# Patient Record
Sex: MALE | Race: WHITE
[De-identification: names, ages, dates, MRNs, and addresses within clinical notes are randomized per-mention and may not be internally consistent; named-entity substitution may affect disease eponyms.]

---

## 2017-07-24 ENCOUNTER — HOSPITAL ENCOUNTER (OUTPATIENT)
Dept: HOSPITAL 80 - FIMAGING | Age: 82
End: 2017-07-24
Attending: FAMILY MEDICINE
Payer: COMMERCIAL

## 2017-07-24 DIAGNOSIS — M51.26: Primary | ICD-10-CM

## 2017-08-09 ENCOUNTER — HOSPITAL ENCOUNTER (OUTPATIENT)
Dept: HOSPITAL 80 - FIMAGING | Age: 82
End: 2017-08-09
Attending: INTERNAL MEDICINE
Payer: COMMERCIAL

## 2017-08-09 DIAGNOSIS — I71.2: ICD-10-CM

## 2017-08-09 DIAGNOSIS — R91.1: ICD-10-CM

## 2017-08-09 DIAGNOSIS — I25.10: Primary | ICD-10-CM

## 2017-08-09 DIAGNOSIS — J84.10: ICD-10-CM

## 2017-08-09 DIAGNOSIS — I71.4: ICD-10-CM

## 2017-08-09 PROCEDURE — 71275 CT ANGIOGRAPHY CHEST: CPT

## 2018-01-02 ENCOUNTER — HOSPITAL ENCOUNTER (OUTPATIENT)
Dept: HOSPITAL 80 - FIMAGING | Age: 83
End: 2018-01-02
Attending: PHYSICIAN ASSISTANT
Payer: COMMERCIAL

## 2018-01-02 DIAGNOSIS — M25.862: Primary | ICD-10-CM

## 2018-01-25 ENCOUNTER — HOSPITAL ENCOUNTER (OUTPATIENT)
Dept: HOSPITAL 80 - FIMAGING | Age: 83
End: 2018-01-25
Attending: ORTHOPAEDIC SURGERY
Payer: COMMERCIAL

## 2018-01-25 DIAGNOSIS — M23.242: Primary | ICD-10-CM

## 2018-01-25 DIAGNOSIS — M22.42: ICD-10-CM

## 2018-01-25 DIAGNOSIS — M71.22: ICD-10-CM

## 2018-09-25 ENCOUNTER — HOSPITAL ENCOUNTER (OUTPATIENT)
Dept: HOSPITAL 80 - FIMAGING | Age: 83
End: 2018-09-25
Attending: FAMILY MEDICINE
Payer: COMMERCIAL

## 2018-09-25 DIAGNOSIS — I71.2: ICD-10-CM

## 2018-09-25 DIAGNOSIS — R91.1: Primary | ICD-10-CM

## 2019-02-22 ENCOUNTER — HOSPITAL ENCOUNTER (EMERGENCY)
Dept: HOSPITAL 80 - FED | Age: 84
Discharge: HOME | End: 2019-02-22
Payer: COMMERCIAL

## 2019-02-22 VITALS — DIASTOLIC BLOOD PRESSURE: 60 MMHG | SYSTOLIC BLOOD PRESSURE: 104 MMHG

## 2019-02-22 DIAGNOSIS — E03.9: ICD-10-CM

## 2019-02-22 DIAGNOSIS — E78.5: ICD-10-CM

## 2019-02-22 DIAGNOSIS — E86.9: ICD-10-CM

## 2019-02-22 DIAGNOSIS — B34.9: Primary | ICD-10-CM

## 2019-02-22 LAB
INR PPP: 1.19 (ref 0.83–1.16)
PLATELET # BLD: 141 10^3/UL (ref 150–400)
PROTHROMBIN TIME: 15.3 SEC (ref 12–15)

## 2019-02-22 PROCEDURE — 93005 ELECTROCARDIOGRAM TRACING: CPT

## 2019-02-22 PROCEDURE — 71275 CT ANGIOGRAPHY CHEST: CPT

## 2019-02-22 PROCEDURE — 99285 EMERGENCY DEPT VISIT HI MDM: CPT

## 2019-02-22 PROCEDURE — 96360 HYDRATION IV INFUSION INIT: CPT

## 2019-02-22 PROCEDURE — 71046 X-RAY EXAM CHEST 2 VIEWS: CPT

## 2019-02-22 RX ADMIN — ONDANSETRON ONE: 2 SOLUTION INTRAMUSCULAR; INTRAVENOUS at 15:07

## 2019-02-22 RX ADMIN — ONDANSETRON ONE MG: 2 SOLUTION INTRAMUSCULAR; INTRAVENOUS at 15:04

## 2019-02-22 NOTE — EDPHY
H & P


Stated Complaint: Fatigue/SOB/cough


Time Seen by Provider: 02/22/19 14:36


HPI/ROS: 





CHIEF COMPLAINT:  Generalized Weakness





HISTORY OF PRESENT ILLNESS:  Patient is an 83-year-old man who states that 

yesterday he was feeling well and did 15 pull-ups.  Then he began to have a 

mild cough last night.  Then this morning he woke up very tired and fatigued 

with a dry cough.  He states that when he stands up he feels lightheaded and 

woozy.  His wife states that she feels the same but not as bad as her .  

He has not had a fever.  He did not get a flu vaccine this year.  No abdominal 

pain.  No chest pain.  No shortness of breath.  Mild nausea but no vomiting.  

No diarrhea.  His blood pressure is also low compared to baseline.  He states 

that normal systolic is around 115. No focal weakness or deficits.  No sore 

throat.  No runny nose.


Severity:  Moderate


Modifying factors:  Worsened by standing





REVIEW OF SYSTEMS:


Constitutional:  See HPI


EENTM: denies: blurred vision, double vision, nose congestion


Respiratory:  See HPI denies: shortness of breath


Cardiac: denies: chest pain, irregular heart rate, lightheadedness, palpitations


Gastrointestinal/Abdominal: denies: abdominal pain, diarrhea, nausea, vomiting, 

blood streaked stools


Genitourinary: denies: dysuria, frequency, hematuria, pain


Musculoskeletal: denies: joint pain, muscle pain


Skin: denies: lesions, rash, jaundice, bruising


Neurological: denies: headache, numbness, paresthesia, tingling, dizziness, 

weakness


Hematologic/Lymphatic: denies: blood clots, easy bleeding, easy bruising


Immunologic/allergic: denies: HIV/AIDS, transplant


 10 systems reviewed and negative except as noted





EXAM:


GENERAL:  Weak, moderate distress


HEAD:  Atraumatic, normocephalic.


EYES:  Pupils equal round and reactive to light, extraocular movements intact, 

sclera anicteric, conjunctiva are normal.


ENT:  TMs normal, nares patent, oropharynx clear without exudates.  dry mucous 

membranes.


NECK:  Normal range of motion, supple without lymphadenopathy or JVD.


LUNGS:  Breath sounds clear to auscultation bilaterally and equal.  No wheezes 

rales or rhonchi.


HEART:  Regular rate and rhythm without murmurs, rubs or gallops.


ABDOMEN:  Soft, nontender, normoactive bowel sounds.  No guarding, no rebound.  

No masses appreciated. 


BACK:  No CVA tenderness, no spinal tenderness, step-offs or deformities


EXTREMITIES:  Normal range of motion, no pitting or edema.  No clubbing or 

cyanosis.


NEUROLOGICAL:  Cranial nerves II through XII grossly intact.  Normal speech, 

normal gait.  5/5 strength, normal movement in all extremities, normal sensation

, normal reflexes


PSYCH:  Normal mood, normal affect.


SKIN:  Warm, dry, normal turgor, no visible rashes or lesions.








Source: Patient


Exam Limitations: No limitations





- Personal History


Current Tetanus/Diphtheria Vaccine: Unsure





- Medical/Surgical History


Hx Asthma: No


Hx Chronic Respiratory Disease: No


Hx Diabetes: No


Hx Cardiac Disease: No


Hx Renal Disease: No


Hx Cirrhosis: No


Hx Alcoholism: No


Other PMH: PE, HLD, hypothryoidism, pancreatitis





- Social History


Smoking Status: Never smoked


Alcohol Use: None


Constitutional: 


 Initial Vital Signs











Temperature (C)  36.7 C   02/22/19 14:19


 


Heart Rate  88   02/22/19 14:19


 


Respiratory Rate  22 H  02/22/19 14:19


 


Blood Pressure  90/54 L  02/22/19 14:19


 


O2 Sat (%)  95   02/22/19 14:19








 











O2 Delivery Mode               Room Air














Allergies/Adverse Reactions: 


 





Penicillins Allergy (Verified 02/22/19 14:24)


 








Home Medications: 














 Medication  Instructions  Recorded


 


Ascorbic Acid [Vitamin C 500 mg 500 mg PO DAILY 09/11/12





(*)]  


 


Aspirin [Baby Aspirin] 81 mg PO DAILY 09/11/12


 


Beta-Carotene [Beta Carotene] 25,000 unit PO Q2D 09/11/12


 


CALCIUM CARBONATE/VITAMIN D3 1 each PO DAILY 09/11/12





[CALCIUM + D 600 MG TABLET]  


 


Cholecalciferol Vit D3 [Vitamin D3 1,000 units PO Q3D 09/11/12





(*)]  


 


Cyanocobalamin [Vitamin B12 (*)] 1,000 mcg PO DAILY 09/11/12


 


Multivitamins [Multivitamin (*)] 1 each PO DAILY 09/11/12


 


Pharmacy Student Completed 9/11/12 09/11/12


 


Selenium [Selenium 200mcg (*)] 200 mcg PO DAILY 09/11/12


 


Simvastatin [Zocor 10 mg] 15 mg PO DAILY18 09/11/12


 


Ubidecarenone [Co Q-10 200 mg] 200 mg PO DAILY 09/11/12














Medical Decision Making





- Diagnostics


EKG Interpretation: 





An EKG obtained and was read and documented in trace view.  Please see trace 

view for full reading and report.  Sinus rhythm, no acute ischemic changes, 

slightly prolonged WA interval similar to previous 


Imaging Results: 


 Imaging Impressions





Chest X-Ray  02/22/19 14:43


Impression: Negative for acute cardiopulmonary abnormality. Mild enlargement 

cardiac silhouette.








Chest/Thorax CTA  02/22/19 16:18


Impression:


1. Slightly limited study with no visible pulmonary embolus.


2. New linear left lower lobe consolidation, more likely representing 

atelectasis than pneumonia, with an indistinct nodule in the superior segment 

left lower lobe, also possibly related to atelectasis. Short-term follow-up CT 

is recommended in 3 months.


3. 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


4. Coronary artery atherosclerosis.


5. Indeterminate pancreatic cyst, likely slightly increased since the comparison

, better visualized on the current study due to bolus timing. MR abdomen could 

be performed for further evaluation.


6. Additional findings as above.


 


Findings discussed with Dr. Jarrett Price on February 22, 2019 at 1709 hours.


 


 











Imaging: Discussed imaging studies w/ On call Radiologist


ED Course/Re-evaluation: 





4:20 p.m. the patient tells me he is feeling much better.  His blood pressures 

responded well to fluids.  He is currently 120/70.  I suggested that he would 

require admission because of his low blood pressure and generalized weakness.  

Respiratory PCR still pending.  Have added CT angio because the positive D-

dimer and history of PE.  His previous PE was provoked by long road trip.  He 

and his wife tell me now that his blood pressure does occasionally get down to 

90 even when he is asymptomatic.  He does not wish to be admitted to the 

hospital.  Will add urinalysis well.





5:20 p.m. patient's workup is unremarkable.  I suspect that he has some type of 

viral infection complicated by dehydration.  He tells me that he is feeling 

completely better.  His blood pressure is very stable after IV fluids.  He is 

eager to go home.  I offered admission because of his symptoms previously.  He 

and his wife decline.  We will do orthostatics.  Encouraged him to return to 

the emergency department if he feels worse or for symptoms return.


Differential Diagnosis: 





Partial list of the Differential diagnosis considered include but were not 

limited to;  dehydration, upper respiratory tract infection, influenza and 

although unlikely based on the history and physical exam, I also considered 

sepsis, urinary tract infection, pneumonia, PE, acute coronary disease. 





- Data Points


Laboratory Results: 


 Laboratory Results





 02/22/19 14:58 





 02/22/19 14:58 





 











  02/22/19 02/22/19 02/22/19





  16:29 14:59 14:58


 


WBC      





    


 


RBC      





    


 


Hgb      





    


 


Hct      





    


 


MCV      





    


 


MCH      





    


 


MCHC      





    


 


RDW      





    


 


Plt Count      





    


 


MPV      





    


 


Neut % (Auto)      





    


 


Lymph % (Auto)      





    


 


Mono % (Auto)      





    


 


Eos % (Auto)      





    


 


Baso % (Auto)      





    


 


Nucleat RBC Rel Count      





    


 


Absolute Neuts (auto)      





    


 


Absolute Lymphs (auto)      





    


 


Absolute Monos (auto)      





    


 


Absolute Eos (auto)      





    


 


Absolute Basos (auto)      





    


 


Absolute Nucleated RBC      





    


 


Immature Gran %      





    


 


Immature Gran #      





    


 


RBC/WBC/PLT Morphology      





    


 


Platelet Estimate      





    


 


PT      





    


 


INR      





    


 


APTT      





    


 


D-Dimer      





    


 


VBG Lactic Acid      1.0 mmol/L mmol/L





     (0.7-2.1) 


 


Sodium      





    


 


Potassium      





    


 


Chloride      





    


 


Carbon Dioxide      





    


 


Anion Gap      





    


 


BUN      





    


 


Creatinine      





    


 


Estimated GFR      





    


 


Glucose      





    


 


Calcium      





    


 


Total Bilirubin      





    


 


Conjugated Bilirubin      





    


 


Unconjugated Bilirubin      





    


 


AST      





    


 


ALT      





    


 


Alkaline Phosphatase      





    


 


POC Troponin I    0.01 ng/mL ng/mL  





    (0.00-0.08)  


 


Total Protein      





    


 


Albumin      





    


 


Urine Color  YELLOW     





    


 


Urine Appearance  CLEAR     





    


 


Urine pH  7.0     





   (5.0-7.5)   


 


Ur Specific Gravity  1.018     





   (1.002-1.030)   


 


Urine Protein  NEGATIVE     





   (NEGATIVE)   


 


Urine Ketones  TRACE  H     





   (NEGATIVE)   


 


Urine Blood  NEGATIVE     





   (NEGATIVE)   


 


Urine Nitrate  NEGATIVE     





   (NEGATIVE)   


 


Urine Bilirubin  NEGATIVE     





   (NEGATIVE)   


 


Urine Urobilinogen  NEGATIVE EU EU    





   (0.2-1.0)   


 


Ur Leukocyte Esterase  NEGATIVE     





   (NEGATIVE)   


 


Urine RBC  1-3 /hpf /hpf    





   (0-3)   


 


Urine WBC  1-3 /hpf /hpf    





   (0-3)   


 


Ur Epithelial Cells  NONE SEEN /lpf /lpf    





   (NONE-1+)   


 


Hyaline Casts  1-5 /lpf /lpf    





   (0-1)   


 


Urine Mucus  TRACE /lpf /lpf    





   (NONE-1+)   


 


Urine Glucose  NEGATIVE     





   (NEGATIVE)   














  02/22/19 02/22/19 02/22/19





  14:58 14:58 14:58


 


WBC      6.25 10^3/uL 10^3/uL





     (3.80-9.50) 


 


RBC      4.90 10^6/uL 10^6/uL





     (4.40-6.38) 


 


Hgb      15.2 g/dL g/dL





     (13.7-17.5) 


 


Hct      45.3 % %





     (40.0-51.0) 


 


MCV      92.4 fL fL





     (81.5-99.8) 


 


MCH      31.0 pg pg





     (27.9-34.1) 


 


MCHC      33.6 g/dL g/dL





     (32.4-36.7) 


 


RDW      13.8 % %





     (11.5-15.2) 


 


Plt Count      141 10^3/uL L 10^3/uL





     (150-400) 


 


MPV      9.7 fL fL





     (8.7-11.7) 


 


Neut % (Auto)      89.8 % H %





     (39.3-74.2) 


 


Lymph % (Auto)      3.0 % L %





     (15.0-45.0) 


 


Mono % (Auto)      6.6 % %





     (4.5-13.0) 


 


Eos % (Auto)      0.0 % L %





     (0.6-7.6) 


 


Baso % (Auto)      0.3 % %





     (0.3-1.7) 


 


Nucleat RBC Rel Count      0.0 % %





     (0.0-0.2) 


 


Absolute Neuts (auto)      5.61 10^3/uL 10^3/uL





     (1.70-6.50) 


 


Absolute Lymphs (auto)      0.19 10^3/uL L 10^3/uL





     (1.00-3.00) 


 


Absolute Monos (auto)      0.41 10^3/uL 10^3/uL





     (0.30-0.80) 


 


Absolute Eos (auto)      0.00 10^3/uL L 10^3/uL





     (0.03-0.40) 


 


Absolute Basos (auto)      0.02 10^3/uL 10^3/uL





     (0.02-0.10) 


 


Absolute Nucleated RBC      0.00 10^3/uL 10^3/uL





     (0-0.01) 


 


Immature Gran %      0.3 % %





     (0.0-1.1) 


 


Immature Gran #      0.02 10^3/uL 10^3/uL





     (0.00-0.10) 


 


RBC/WBC/PLT Morphology      TNP 





    


 


Platelet Estimate      TNP 





    


 


PT    15.3 SEC H SEC  





    (12.0-15.0)  


 


INR    1.19  H   





    (0.83-1.16)  


 


APTT    21.9 SEC L SEC  





    (23.0-38.0)  


 


D-Dimer    1.33 ug/mLFEU H ug/mLFEU  





    (0.00-0.50)  


 


VBG Lactic Acid      





    


 


Sodium  135 mEq/L mEq/L    





   (135-145)   


 


Potassium  4.4 mEq/L mEq/L    





   (3.5-5.2)   


 


Chloride  106 mEq/L mEq/L    





   ()   


 


Carbon Dioxide  23 mEq/l mEq/l    





   (22-31)   


 


Anion Gap  6 mEq/L mEq/L    





   (6-14)   


 


BUN  22 mg/dL mg/dL    





   (7-23)   


 


Creatinine  1.0 mg/dL mg/dL    





   (0.7-1.3)   


 


Estimated GFR  > 60     





    


 


Glucose  104 mg/dL H mg/dL    





   ()   


 


Calcium  9.0 mg/dL mg/dL    





   (8.5-10.4)   


 


Total Bilirubin  1.0 mg/dL mg/dL    





   (0.1-1.4)   


 


Conjugated Bilirubin  0.2 mg/dL mg/dL    





   (0.0-0.5)   


 


Unconjugated Bilirubin  0.8 mg/dL mg/dL    





   (0.0-1.1)   


 


AST  39 IU/L IU/L    





   (17-59)   


 


ALT  45 IU/L IU/L    





   (21-72)   


 


Alkaline Phosphatase  46 IU/L IU/L    





   ()   


 


POC Troponin I      





    


 


Total Protein  6.1 g/dL L g/dL    





   (6.3-8.2)   


 


Albumin  3.5 g/dL g/dL    





   (3.5-5.0)   


 


Urine Color      





    


 


Urine Appearance      





    


 


Urine pH      





    


 


Ur Specific Gravity      





    


 


Urine Protein      





    


 


Urine Ketones      





    


 


Urine Blood      





    


 


Urine Nitrate      





    


 


Urine Bilirubin      





    


 


Urine Urobilinogen      





    


 


Ur Leukocyte Esterase      





    


 


Urine RBC      





    


 


Urine WBC      





    


 


Ur Epithelial Cells      





    


 


Hyaline Casts      





    


 


Urine Mucus      





    


 


Urine Glucose      





    











Microbiology Results: 


 MICROBIOLOGY





02/22/19 14:25   Nasal, Sinus - Swab   Respiratory Panel (PCR) - Final


                            No Organism Detected By Pcr





Medications Given: 


 








Discontinued Medications





Sodium Chloride (Ns)  2,100 mls @ 4,200 mls/hr 30 ml/kg infuse over 30 min (

2100 ml) IV EDNOW ONE


   PRN Reason: Protocol


   Stop: 02/22/19 15:11


   Last Admin: 02/22/19 15:01 Dose:  2,100 mls


Ondansetron HCl (Zofran)  4 mg IVP EDNOW ONE


   Stop: 02/22/19 14:49


   Last Admin: 02/22/19 15:07 Dose:  Not Given





Point of Care Test Results: 


 Chemistry











  02/22/19





  14:59


 


POC Troponin I  0.01 ng/mL ng/mL





   (0.00-0.08) 














Departure





- Departure


Disposition: Home, Routine, Self-Care


Clinical Impression: 


 Dehydration, Viral syndrome





Condition: Fair


Instructions:  Dehydration (ED)


Referrals: 


Leslie Angeles MD [Primary Care Provider] - As per Instructions

## 2019-02-22 NOTE — CPEKG
Test Reason : OPEN

Blood Pressure : ***/*** mmHG

Vent. Rate : 071 BPM     Atrial Rate : 071 BPM

   P-R Int : 231 ms          QRS Dur : 085 ms

    QT Int : 375 ms       P-R-T Axes : 076 014 048 degrees

   QTc Int : 408 ms

 

Sinus rhythm

Prolonged DC interval

Probable left atrial enlargement

 

Confirmed by Carol Youssef ([id number]) on 2/22/2019 3:09:32 PM

 

Referred By: CAROL YOUSSEF           Confirmed By:Carol Youssef